# Patient Record
Sex: MALE | Race: BLACK OR AFRICAN AMERICAN
[De-identification: names, ages, dates, MRNs, and addresses within clinical notes are randomized per-mention and may not be internally consistent; named-entity substitution may affect disease eponyms.]

---

## 2019-06-27 ENCOUNTER — HOSPITAL ENCOUNTER (EMERGENCY)
Dept: HOSPITAL 41 - JD.ED | Age: 29
Discharge: HOME | End: 2019-06-27
Payer: COMMERCIAL

## 2019-06-27 DIAGNOSIS — T16.2XXA: Primary | ICD-10-CM

## 2019-06-27 DIAGNOSIS — H61.22: ICD-10-CM

## 2019-06-27 NOTE — EDM.PDOC
ED HPI GENERAL MEDICAL PROBLEM





- General


Chief Complaint: ENT Problem


Stated Complaint: BUG IN LEFT EAR


Time Seen by Provider: 06/27/19 02:06


Source of Information: Reports: Patient


History Limitations: Reports: No Limitations





- History of Present Illness


INITIAL COMMENTS - FREE TEXT/NARRATIVE: 





29-year-old male presents to the ED with a foreign body sensation in his left 

ear. He states he's driving tonight and stop that a rest stop outside of town 

and felt an insect fly into his left ear. He can still feel the insect 

intermittently fluttering against his eardrum is very on carpal sensation. No 

tinnitus nausea vomiting.


Onset: Today


Onset Date: 06/27/19


Onset Time: 01:00


Duration: Minutes:


Location: Reports: Face (Foreign body sensation left ear)


Quality: Reports: Other


Severity: Moderate (Lettering irritation left ear)


Improves with: Reports: None


Worsens with: Reports: None


Context: Denies: Activity


Associated Symptoms: Reports: No Other Symptoms, Other.  Denies: Nausea/Vomiting


Treatments PTA: Reports: Other (see below) (No vertigo none.)





- Related Data


 Allergies











Allergy/AdvReac Type Severity Reaction Status Date / Time


 


No Known Allergies Allergy   Verified 06/27/19 02:06











Home Meds: 


 Home Meds





. [No Known Home Meds]  06/27/19 [History]











Past Medical History


Other Cardiovascular History: chest pain and heart cath in 2007





Social & Family History





- Tobacco Use


Smoking Status *Q: Never Smoker





- Caffeine Use


Caffeine Use: Reports: Coffee, Soda





ED ROS ENT





- Review of Systems


Review Of Systems: See Below


Constitutional: Reports: No Symptoms


HEENT: Reports: Ear Pain


Respiratory: Reports: No Symptoms (Foreign body sensation in left ear. Felt an 

insect fly into his ear at the local rest stop outside of town.)


Cardiovascular: Reports: No Symptoms


Endocrine: Reports: No Symptoms


GI/Abdominal: Reports: No Symptoms


: Reports: No Symptoms


Musculoskeletal: Reports: No Symptoms


Skin: Reports: No Symptoms


Neurological: Reports: No Symptoms


Psychiatric: Reports: No Symptoms


Hematologic/Lymphatic: Reports: No Symptoms


Immunologic: Reports: No Symptoms





ED EXAM, ENT





- Physical Exam


Exam: See Below


Exam Limited By: No Limitations


General Appearance: Alert, WD/WN, No Apparent Distress


Ears: Canal Foreign Body (On the left side there does appear to be a bug/insect 

in the left ear hiding against his eardrum behind some wax. Appears to be a 

beetle like insect.), Canal Material (The right ureter is about 50-60 present 

filled with cerumen. The eardrum appears intact and normal.)





Course





- Vital Signs


Last Recorded V/S: 


 Last Vital Signs











Temp      


 


Pulse  83   06/27/19 02:04


 


Resp  16   06/27/19 02:04


 


BP  171/97 H  06/27/19 02:04


 


Pulse Ox  99   06/27/19 02:04














- Orders/Labs/Meds


Meds: 


Medications














Discontinued Medications














Generic Name Dose Route Start Last Admin





  Trade Name Jaime  PRN Reason Stop Dose Admin


 


Gentamicin Sulfate  2.5 ml  06/27/19 02:59  06/27/19 03:06





  Garamycin 0.3% Ophth Soln  EARLF  06/27/19 03:00  2.5 ml





  ONETIME ONE   Administration





     





     





     





     


 


Lidocaine HCl  10 ml  06/27/19 02:13  06/27/19 02:17





  Xylocaine 2% Jelly  MUCMEM  06/27/19 02:14  10 ml





  ONETIME ONE   Administration





     





     





     





     


 


Lidocaine HCl  Confirm  06/27/19 02:11  06/27/19 02:17





  Xylocaine 2% Jelly  Administered  06/27/19 02:12  Not Given





  Dose   





  10 ml   





  .ROUTE   





  .STK-MED ONE   





     





     





     





     














- Radiology Interpretation


Free Text/Narrative:: 


29-year-old male presents to the ED with foreign body sensation in his left 

ear. He states he stopped at a local restaurant outside of [facility] and felt a 

insect fly into his left ear. Since that time it is irritated him severely in 

terms of fluttering against his eardrum. Emanation there is a insect up against 

his eardrum behind some cerumen on the left side. The right side is normal. He'

s also been scratching as there is irritation of the inferior floor of the left 

ear canal. Plan ear canal will be filled with lidocaine gel which will smother 

the insect and also usually paralyze it due to the lidocaine over the next 15 

minutes. Irrigation of the ear canal but can then be carried out to remove the 

insect.








- Re-Assessments/Exams


Free Text/Narrative Re-Assessment/Exam: 





06/27/19 03:09  patient does not feel any movement of the insect in his left 

ear after lidocaine was placed in the air for 15 minutes. Nurses had been 

irrigating the ear and did remove a good portion of cerumen but cannot identify 

any definitive insect. She doesn't feel any fluttering in his ear at this time. 

On reexamination the cerumen and area I thought was of concern has been 

irrigated out of his ear. There is still some cerumen on the roof of the ear 

canal close to the eardrum. I do not feel that an insect could hide up 

underneath this area over it is still possible. Nurses will continue to 

irrigate the ear and I will review.





06/27/19 03:19: Nurses have  irrigated most of the wax from his right ear canal 

and he still has residual cerumen in the roof of his left ear canal. There is 

irritation of the floor of the ear canal from his fingernail. Is going to place 

him on some Auralgan optic drops to relieve pain and to help further relieve 

cerumen impaction as it is mostly an  oral based medicine. However it is no 

longer available in the hospital pharmacy. Substituted gentamicin eardrops to 

the left ear twice daily for the next 3 days to prevent any infection in the 

ear canal and it is oil-based and will help remove any cerumen as well.














Departure





- Departure


Time of Disposition: 03:24


Disposition: Home, Self-Care 01


Condition: Fair


Clinical Impression: 


 Foreign body in left ear, initial encounter





Foreign body in ear


Qualifiers:


 Encounter type: initial encounter Laterality: left Qualified Code(s): T16.2XXA 

- Foreign body in left ear, initial encounter








- Discharge Information


*PRESCRIPTION DRUG MONITORING PROGRAM REVIEWED*: Not Applicable


*COPY OF PRESCRIPTION DRUG MONITORING REPORT IN PATIENT KYM: Not Applicable


Instructions:  Ear Foreign Body, Easy-to-Read


Referrals: 


PCP,Not In Area [Primary Care Provider] - 


Forms:  ED Department Discharge


Additional Instructions: 


Evaluation the emergency room tonight in regards to foreign body sensation a 

insect flew into her left ear while at a local restaurant tonight. On my 

initial examination the right ear canal is about 60% filled with cerumen but 

the tympanic membrane or eardrums look normal. On the left side there again was 

significant cerumen impaction and I thought I could identify a beetle -like 

insect against the eardrum behind the cerumen anterior laterally of the ear 

canal. You're treated with lidocaine gel to kill the dog in combination of 

smothering plus lidocaine causes paralysis of the insect nervous system. The 

ureters were then irrigated aggressively but nurses never appreciated any 

insect coming out. On my inspection the cerumen that was up against the 

anterior wall of the left ear canal has pins removed. I find no evidence of a 

insect in the ear on the repeated exam. There still is some cerumen in the roof 

of the ear canal against the eardrum. Most of the right was also removed from 

the right ear canal as well. There is a scratch to the floor of your left ear 

canal likely from fingernail. You are given gentamicin eardrops to take home. 

The left ear canal was filled with gentamicin drops prior to discharge suggest 

using 3-4 drops at bedtime tonight and again the following day both first thing 

in the morning and at bedtime to prevent any infection in the ear canal. Also 

loosen the residual cerumen or ear wax that is on the top of the ear canal.